# Patient Record
Sex: MALE | Race: WHITE | Employment: OTHER | URBAN - METROPOLITAN AREA
[De-identification: names, ages, dates, MRNs, and addresses within clinical notes are randomized per-mention and may not be internally consistent; named-entity substitution may affect disease eponyms.]

---

## 2017-11-03 ENCOUNTER — APPOINTMENT (OUTPATIENT)
Dept: GENERAL RADIOLOGY | Age: 37
End: 2017-11-03
Attending: NURSE PRACTITIONER
Payer: COMMERCIAL

## 2017-11-03 ENCOUNTER — HOSPITAL ENCOUNTER (OUTPATIENT)
Age: 37
Discharge: HOME | End: 2017-11-03
Payer: COMMERCIAL

## 2017-11-03 VITALS
TEMPERATURE: 98 F | WEIGHT: 165 LBS | SYSTOLIC BLOOD PRESSURE: 144 MMHG | OXYGEN SATURATION: 100 % | HEART RATE: 65 BPM | HEIGHT: 69 IN | BODY MASS INDEX: 24.44 KG/M2 | RESPIRATION RATE: 16 BRPM | DIASTOLIC BLOOD PRESSURE: 90 MMHG

## 2017-11-03 DIAGNOSIS — S62.354A CLOSED NONDISPLACED FRACTURE OF SHAFT OF FOURTH METACARPAL BONE OF RIGHT HAND, INITIAL ENCOUNTER: Primary | ICD-10-CM

## 2017-11-03 PROBLEM — L72.0 EPIDERMAL CYST OF FACE: Status: ACTIVE | Noted: 2017-11-03

## 2017-11-03 PROCEDURE — 99204 OFFICE O/P NEW MOD 45 MIN: CPT

## 2017-11-03 PROCEDURE — 29125 APPL SHORT ARM SPLINT STATIC: CPT

## 2017-11-03 PROCEDURE — 73130 X-RAY EXAM OF HAND: CPT | Performed by: NURSE PRACTITIONER

## 2017-11-03 PROCEDURE — 99203 OFFICE O/P NEW LOW 30 MIN: CPT

## 2017-11-03 RX ORDER — HYDROCODONE BITARTRATE AND ACETAMINOPHEN 5; 325 MG/1; MG/1
1 TABLET ORAL EVERY 6 HOURS PRN
Qty: 20 TABLET | Refills: 0 | Status: SHIPPED | OUTPATIENT
Start: 2017-11-03 | End: 2017-12-21 | Stop reason: ALTCHOICE

## 2017-11-03 RX ORDER — HYDROCODONE BITARTRATE AND ACETAMINOPHEN 5; 325 MG/1; MG/1
1 TABLET ORAL EVERY 6 HOURS PRN
Status: DISCONTINUED | OUTPATIENT
Start: 2017-11-03 | End: 2017-11-03

## 2017-11-03 NOTE — ED PROVIDER NOTES
Patient Seen in: [address] [facility]    History   Patient presents with:  Hand Injury    Stated Complaint: right hand injury    14-year-old male presents today with complaints of right hand pain and swelling.   Patient states was using a drill range of motion. Neck supple. Pulmonary/Chest: Effort normal.   Musculoskeletal:   Pain with palpation over the fourth and fifth metacarpal of the right hand. Mild swelling to the dorsal aspect of the region. No crepitus or gross deformity noted.   Skin pain.      Disposition and Plan     Clinical Impression:  Closed displaced fracture of shaft of fourth metacarpal bone of right hand, initial encounter  (primary encounter diagnosis)    Disposition:  Discharge    Follow-up:  Bharath Ryan, [address] [place] [address]

## 2017-11-03 NOTE — ED INITIAL ASSESSMENT (HPI)
Pt states was drilling with a hand drill last night and the drill twisted suddenly and pt felt sudden sharp pain in right hand. States suddenly became nauseated and vomitied x 1. Took a tylenol with codeine and slept all night.  This am moved his hand and f

## 2017-11-06 ENCOUNTER — OFFICE VISIT (OUTPATIENT)
Dept: FAMILY MEDICINE CLINIC | Facility: CLINIC | Age: 37
End: 2017-11-06

## 2017-11-06 VITALS
WEIGHT: 165 LBS | DIASTOLIC BLOOD PRESSURE: 64 MMHG | HEIGHT: 68.5 IN | BODY MASS INDEX: 24.72 KG/M2 | HEART RATE: 76 BPM | TEMPERATURE: 98 F | SYSTOLIC BLOOD PRESSURE: 110 MMHG | RESPIRATION RATE: 16 BRPM

## 2017-11-06 DIAGNOSIS — S62.91XA RIGHT HAND FRACTURE, CLOSED, INITIAL ENCOUNTER: Primary | ICD-10-CM

## 2017-11-06 PROCEDURE — 99203 OFFICE O/P NEW LOW 30 MIN: CPT | Performed by: FAMILY MEDICINE

## 2017-11-06 NOTE — PROGRESS NOTES
Patricia Reaves is a 40year old male. Patient presents with:  [facility] F/U: right hand fracture- thursday       HPI:   Hand fracture. Injury on Thursday, was drilling and the electric drill caught and twisted his hand and he heard a pop.   Seen in auscultation  CARDIO: RRR without murmur  GI: good BS's,no masses, HSM or tenderness  EXTREMITIES: no cyanosis, clubbing or edema, right arm in a splint, fingers warm, pink and moving without pain. No swelling or redness in forearm.      ASSESSMENT AND PLAN

## 2017-12-21 ENCOUNTER — OFFICE VISIT (OUTPATIENT)
Dept: FAMILY MEDICINE CLINIC | Facility: CLINIC | Age: 37
End: 2017-12-21

## 2017-12-21 VITALS
WEIGHT: 167 LBS | SYSTOLIC BLOOD PRESSURE: 128 MMHG | HEART RATE: 67 BPM | OXYGEN SATURATION: 98 % | HEIGHT: 68 IN | TEMPERATURE: 99 F | BODY MASS INDEX: 25.31 KG/M2 | DIASTOLIC BLOOD PRESSURE: 82 MMHG | RESPIRATION RATE: 14 BRPM

## 2017-12-21 DIAGNOSIS — Z00.00 HEALTHY ADULT ON ROUTINE PHYSICAL EXAMINATION: Primary | ICD-10-CM

## 2017-12-21 PROCEDURE — 99395 PREV VISIT EST AGE 18-39: CPT | Performed by: FAMILY MEDICINE

## 2017-12-21 PROCEDURE — G0438 PPPS, INITIAL VISIT: HCPCS | Performed by: FAMILY MEDICINE

## 2017-12-21 NOTE — PROGRESS NOTES
Kori Corrigan is a 40year old male who presents for a complete physical exam.   HPI:   Pt complains of nothing today. Hand fracture has healed. Feeling much better. Was following with Dr. Windy Dias.    Has a small lump on another finger, not painful or grow of anemia  ENDOCRINE: denies thyroid history   ALL/ASTHMA: denies hx of allergy or asthma     EXAM:   /82 (BP Location: Right arm, Patient Position: Sitting, Cuff Size: adult)   Pulse 67   Temp 99.1 °F (37.3 °C) (Temporal)   Resp 14   Ht 68\"   Wt 16

## 2018-01-22 ENCOUNTER — PATIENT OUTREACH (OUTPATIENT)
Dept: FAMILY MEDICINE CLINIC | Facility: CLINIC | Age: 38
End: 2018-01-22

## 2018-01-31 ENCOUNTER — TELEPHONE (OUTPATIENT)
Dept: FAMILY MEDICINE CLINIC | Facility: CLINIC | Age: 38
End: 2018-01-31

## 2018-02-06 ENCOUNTER — NURSE ONLY (OUTPATIENT)
Dept: FAMILY MEDICINE CLINIC | Facility: CLINIC | Age: 38
End: 2018-02-06

## 2018-02-06 PROCEDURE — 90471 IMMUNIZATION ADMIN: CPT | Performed by: FAMILY MEDICINE

## 2018-02-06 PROCEDURE — 86580 TB INTRADERMAL TEST: CPT | Performed by: FAMILY MEDICINE

## 2018-02-06 PROCEDURE — 90715 TDAP VACCINE 7 YRS/> IM: CPT | Performed by: FAMILY MEDICINE

## 2018-02-06 NOTE — PROGRESS NOTES
Patient to clinic for 1 step TB and Tdap. PPD placed right forearm, bleb noted. Patient advised to schedule nurse visit in 48-72 hours to have PPD read    Tdap given IM right deltoid.   VIS given

## 2018-02-08 ENCOUNTER — NURSE ONLY (OUTPATIENT)
Dept: FAMILY MEDICINE CLINIC | Facility: CLINIC | Age: 38
End: 2018-02-08

## 2018-02-08 DIAGNOSIS — Z11.1 ENCOUNTER FOR PPD TEST: Primary | ICD-10-CM

## 2018-02-08 LAB — INDURATION (): 0 MM (ref 0–11)

## 2018-02-08 NOTE — PROGRESS NOTES
PPD Placed 2/6/18 right forearm. Read today, 2/8/18, results negative.     Form completed and given to patient  Copy sent to scanning

## 2019-02-01 ENCOUNTER — OFFICE VISIT (OUTPATIENT)
Dept: FAMILY MEDICINE CLINIC | Facility: CLINIC | Age: 39
End: 2019-02-01
Payer: COMMERCIAL

## 2019-02-01 VITALS
HEART RATE: 80 BPM | DIASTOLIC BLOOD PRESSURE: 80 MMHG | SYSTOLIC BLOOD PRESSURE: 122 MMHG | TEMPERATURE: 99 F | HEIGHT: 68 IN | BODY MASS INDEX: 23.79 KG/M2 | WEIGHT: 157 LBS | RESPIRATION RATE: 16 BRPM

## 2019-02-01 DIAGNOSIS — N50.811 TESTICULAR PAIN, RIGHT: Primary | ICD-10-CM

## 2019-02-01 DIAGNOSIS — N50.89 TESTICULAR LUMP: ICD-10-CM

## 2019-02-01 LAB
APPEARANCE: CLEAR
BILIRUBIN: NEGATIVE
GLUCOSE (URINE DIPSTICK): NEGATIVE MG/DL
KETONES (URINE DIPSTICK): NEGATIVE MG/DL
LEUKOCYTES: NEGATIVE
MULTISTIX LOT#: NORMAL NUMERIC
NITRITE, URINE: NEGATIVE
OCCULT BLOOD: NEGATIVE
PH, URINE: 7 (ref 4.5–8)
PROTEIN (URINE DIPSTICK): NEGATIVE MG/DL
SPECIFIC GRAVITY: 1.02 (ref 1–1.03)
URINE-COLOR: YELLOW
UROBILINOGEN,SEMI-QN: 0.2 MG/DL (ref 0–1.9)

## 2019-02-01 PROCEDURE — 99214 OFFICE O/P EST MOD 30 MIN: CPT | Performed by: FAMILY MEDICINE

## 2019-02-01 PROCEDURE — 87491 CHLMYD TRACH DNA AMP PROBE: CPT | Performed by: FAMILY MEDICINE

## 2019-02-01 PROCEDURE — 87591 N.GONORRHOEAE DNA AMP PROB: CPT | Performed by: FAMILY MEDICINE

## 2019-02-01 PROCEDURE — 81003 URINALYSIS AUTO W/O SCOPE: CPT | Performed by: FAMILY MEDICINE

## 2019-02-01 NOTE — PROGRESS NOTES
Emiliana Gutierrez is a 45year old male. Patient presents with:  Pain: groin pain and swelling      HPI:   Right testicle is a lot more sensitive if it gets hit or with sitting or moving. That started at least a month ago. It's been mild.    This week woke u kg/m².      GENERAL: well developed, well nourished,in no apparent distress  SKIN: no rashes,no suspicious lesions, no rashes.    HEENT: atraumatic, normocephalic,ears and throat are clear  NECK: supple,no adenopathy   LUNGS: clear to auscultation  CARDIO:

## 2019-02-03 LAB
C TRACH DNA SPEC QL NAA+PROBE: NEGATIVE
N GONORRHOEA DNA SPEC QL NAA+PROBE: NEGATIVE

## 2019-02-05 ENCOUNTER — HOSPITAL ENCOUNTER (OUTPATIENT)
Dept: ULTRASOUND IMAGING | Age: 39
Discharge: HOME | End: 2019-02-05
Attending: FAMILY MEDICINE
Payer: COMMERCIAL

## 2019-02-05 ENCOUNTER — TELEPHONE (OUTPATIENT)
Dept: FAMILY MEDICINE CLINIC | Facility: CLINIC | Age: 39
End: 2019-02-05

## 2019-02-05 DIAGNOSIS — N50.89 TESTICULAR MASS: Primary | ICD-10-CM

## 2019-02-05 DIAGNOSIS — N50.811 TESTICULAR PAIN, RIGHT: ICD-10-CM

## 2019-02-05 PROCEDURE — 76870 US EXAM SCROTUM: CPT | Performed by: FAMILY MEDICINE

## 2019-02-05 PROCEDURE — 93975 VASCULAR STUDY: CPT | Performed by: FAMILY MEDICINE

## 2019-02-05 NOTE — TELEPHONE ENCOUNTER
Called pt with results of testicular US showing a mass of the right testicle. Testicular cancer is likely. Advised consult with urology. Referral placed. RN called Dr. Rachel Mendosa office and they can get him in tomorrow at 5 am in [employer].  Appointment peter

## 2019-02-15 ENCOUNTER — ANESTHESIA (OUTPATIENT)
Dept: SURGERY | Facility: HOSPITAL | Age: 39
End: 2019-02-15
Payer: COMMERCIAL

## 2019-02-15 ENCOUNTER — ANESTHESIA EVENT (OUTPATIENT)
Dept: SURGERY | Facility: HOSPITAL | Age: 39
End: 2019-02-15
Payer: COMMERCIAL

## 2019-02-15 ENCOUNTER — HOSPITAL ENCOUNTER (OUTPATIENT)
Facility: HOSPITAL | Age: 39
Setting detail: HOSPITAL OUTPATIENT SURGERY
Discharge: HOME | End: 2019-02-15
Attending: UROLOGY | Admitting: UROLOGY
Payer: COMMERCIAL

## 2019-02-15 VITALS
SYSTOLIC BLOOD PRESSURE: 118 MMHG | HEART RATE: 51 BPM | RESPIRATION RATE: 18 BRPM | HEIGHT: 68 IN | WEIGHT: 159.63 LBS | OXYGEN SATURATION: 100 % | DIASTOLIC BLOOD PRESSURE: 75 MMHG | TEMPERATURE: 98 F | BODY MASS INDEX: 24.19 KG/M2

## 2019-02-15 DIAGNOSIS — N50.89 MASS OF RIGHT TESTIS: ICD-10-CM

## 2019-02-15 PROCEDURE — 0VT90ZZ RESECTION OF RIGHT TESTIS, OPEN APPROACH: ICD-10-PCS | Performed by: UROLOGY

## 2019-02-15 PROCEDURE — 88309 TISSUE EXAM BY PATHOLOGIST: CPT | Performed by: UROLOGY

## 2019-02-15 PROCEDURE — 0VTJ0ZZ RESECTION OF RIGHT EPIDIDYMIS, OPEN APPROACH: ICD-10-PCS | Performed by: UROLOGY

## 2019-02-15 PROCEDURE — 0VBF0ZZ EXCISION OF RIGHT SPERMATIC CORD, OPEN APPROACH: ICD-10-PCS | Performed by: UROLOGY

## 2019-02-15 RX ORDER — HYDROCODONE BITARTRATE AND ACETAMINOPHEN 5; 325 MG/1; MG/1
2 TABLET ORAL AS NEEDED
Status: DISCONTINUED | OUTPATIENT
Start: 2019-02-15 | End: 2019-02-15

## 2019-02-15 RX ORDER — NALOXONE HYDROCHLORIDE 0.4 MG/ML
80 INJECTION, SOLUTION INTRAMUSCULAR; INTRAVENOUS; SUBCUTANEOUS AS NEEDED
Status: DISCONTINUED | OUTPATIENT
Start: 2019-02-15 | End: 2019-02-15

## 2019-02-15 RX ORDER — HYDROCODONE BITARTRATE AND ACETAMINOPHEN 5; 325 MG/1; MG/1
1 TABLET ORAL AS NEEDED
Status: DISCONTINUED | OUTPATIENT
Start: 2019-02-15 | End: 2019-02-15

## 2019-02-15 RX ORDER — CLINDAMYCIN PHOSPHATE 900 MG/50ML
900 INJECTION INTRAVENOUS ONCE
Status: DISCONTINUED | OUTPATIENT
Start: 2019-02-15 | End: 2019-02-15 | Stop reason: HOSPADM

## 2019-02-15 RX ORDER — SODIUM CHLORIDE, SODIUM LACTATE, POTASSIUM CHLORIDE, CALCIUM CHLORIDE 600; 310; 30; 20 MG/100ML; MG/100ML; MG/100ML; MG/100ML
INJECTION, SOLUTION INTRAVENOUS CONTINUOUS
Status: DISCONTINUED | OUTPATIENT
Start: 2019-02-15 | End: 2019-02-15

## 2019-02-15 RX ORDER — ROPIVACAINE HYDROCHLORIDE 5 MG/ML
INJECTION, SOLUTION EPIDURAL; INFILTRATION; PERINEURAL AS NEEDED
Status: DISCONTINUED | OUTPATIENT
Start: 2019-02-15 | End: 2019-02-15 | Stop reason: HOSPADM

## 2019-02-15 RX ORDER — CLINDAMYCIN PHOSPHATE 600 MG/50ML
INJECTION INTRAVENOUS
Status: COMPLETED | OUTPATIENT
Start: 2019-02-15 | End: 2019-02-15

## 2019-02-15 RX ORDER — ACETAMINOPHEN 500 MG
1000 TABLET ORAL ONCE
Status: DISCONTINUED | OUTPATIENT
Start: 2019-02-15 | End: 2019-02-15 | Stop reason: HOSPADM

## 2019-02-15 RX ORDER — HYDROCODONE BITARTRATE AND ACETAMINOPHEN 5; 325 MG/1; MG/1
1 TABLET ORAL EVERY 6 HOURS PRN
Qty: 20 TABLET | Refills: 1 | Status: SHIPPED | OUTPATIENT
Start: 2019-02-15 | End: 2019-02-25

## 2019-02-15 RX ORDER — HYDROMORPHONE HYDROCHLORIDE 1 MG/ML
0.4 INJECTION, SOLUTION INTRAMUSCULAR; INTRAVENOUS; SUBCUTANEOUS EVERY 5 MIN PRN
Status: DISCONTINUED | OUTPATIENT
Start: 2019-02-15 | End: 2019-02-15

## 2019-02-15 RX ORDER — ACETAMINOPHEN 500 MG
1000 TABLET ORAL ONCE AS NEEDED
Status: DISCONTINUED | OUTPATIENT
Start: 2019-02-15 | End: 2019-02-15

## 2019-02-15 NOTE — ANESTHESIA POSTPROCEDURE EVALUATION
Adina Gilman [address]  Odalys Dickinson Patient Status:  Hospital Outpatient Surgery   Age/Gender 45year old male MRN [id number]   Location [address] [facility] Attending Wendie Tao MD   Hosp Day # 0 PCP DO Mary Graves

## 2019-02-15 NOTE — H&P
Pre-op Diagnosis: Mass of right testis [N50.9]    The above referenced H&P from 2/6/19 was reviewed by Dayanara Rdz MD on 2/15/2019, the patient was examined and no significant changes have occurred in the patient's condition since the H&P was perform

## 2019-02-15 NOTE — ANESTHESIA PREPROCEDURE EVALUATION
PRE-OP EVALUATION    Patient Name: Eva Elise    Pre-op Diagnosis: Mass of right testis [N50.9]    Procedure(s):  RIGHT RADICAL ORCHIECTOMY    Surgeon(s) and Role:     * La Nelson MD - Primary    Pre-op vitals reviewed.   Temp: 98.2 °F (36.8 Pulmonary    Pulmonary exam normal.                 Other findings            ASA: 2   Plan: general  NPO status verified and patient meets guidelines. Patient has not taken beta blockers in last 24 hours.   Post-procedure pain management plan discussed wi

## 2019-02-27 PROBLEM — C62.92: Status: ACTIVE | Noted: 2019-02-27

## 2019-04-17 PROCEDURE — 84702 CHORIONIC GONADOTROPIN TEST: CPT | Performed by: INTERNAL MEDICINE

## 2019-06-21 PROCEDURE — 84702 CHORIONIC GONADOTROPIN TEST: CPT | Performed by: INTERNAL MEDICINE

## 2019-09-23 PROCEDURE — 84702 CHORIONIC GONADOTROPIN TEST: CPT | Performed by: INTERNAL MEDICINE

## 2019-12-16 ENCOUNTER — OFFICE VISIT (OUTPATIENT)
Dept: FAMILY MEDICINE CLINIC | Facility: CLINIC | Age: 39
End: 2019-12-16
Payer: COMMERCIAL

## 2019-12-16 ENCOUNTER — HOSPITAL ENCOUNTER (OUTPATIENT)
Dept: GENERAL RADIOLOGY | Age: 39
Discharge: HOME | End: 2019-12-16
Attending: FAMILY MEDICINE
Payer: COMMERCIAL

## 2019-12-16 ENCOUNTER — TELEPHONE (OUTPATIENT)
Dept: FAMILY MEDICINE CLINIC | Facility: CLINIC | Age: 39
End: 2019-12-16

## 2019-12-16 VITALS
BODY MASS INDEX: 23.95 KG/M2 | WEIGHT: 158 LBS | TEMPERATURE: 99 F | SYSTOLIC BLOOD PRESSURE: 128 MMHG | HEART RATE: 72 BPM | DIASTOLIC BLOOD PRESSURE: 86 MMHG | RESPIRATION RATE: 16 BRPM | HEIGHT: 68 IN

## 2019-12-16 DIAGNOSIS — R07.9 CHEST PAIN, UNSPECIFIED TYPE: ICD-10-CM

## 2019-12-16 DIAGNOSIS — R07.9 CHEST PAIN, UNSPECIFIED TYPE: Primary | ICD-10-CM

## 2019-12-16 DIAGNOSIS — F41.9 ANXIETY: ICD-10-CM

## 2019-12-16 DIAGNOSIS — R12 HEART BURN: ICD-10-CM

## 2019-12-16 PROCEDURE — 71046 X-RAY EXAM CHEST 2 VIEWS: CPT | Performed by: FAMILY MEDICINE

## 2019-12-16 PROCEDURE — 93000 ELECTROCARDIOGRAM COMPLETE: CPT | Performed by: FAMILY MEDICINE

## 2019-12-16 PROCEDURE — 99214 OFFICE O/P EST MOD 30 MIN: CPT | Performed by: FAMILY MEDICINE

## 2019-12-16 NOTE — PROGRESS NOTES
Alejandra Carrero is a 44year old male. Patient presents with:  Chest Pain: possible pulled muscle  Anxiety: palpitations      HPI:   Last week strted with a pulled muscle, hurt when he moved. Then he started getting heartburn.  He was worried about everyth well developed, well nourished,in no apparent distress  SKIN: no rashes,no suspicious lesions  HEENT: atraumatic, normocephalic,ears and throat are clear  NECK: supple,no adenopathy,   LUNGS: clear to auscultation  CARDIO: RRR without murmur  GI: good BS's

## 2019-12-16 NOTE — TELEPHONE ENCOUNTER
Pt states last week he noticed some intermittent muscle pain in his chest.  Pt is concerned this is anxiety. Pt denies numbness, tingling in arms- no pain in arms or jaw    Pt states he has no blood pressure issues.     Pt denies SOB and difficulty tyson

## 2020-01-14 ENCOUNTER — TELEPHONE (OUTPATIENT)
Dept: FAMILY MEDICINE CLINIC | Facility: CLINIC | Age: 40
End: 2020-01-14

## 2020-01-14 DIAGNOSIS — N50.89 TESTICULAR MASS: Primary | ICD-10-CM

## 2020-01-14 DIAGNOSIS — C62.11 MALIGNANT NEOPLASM OF DESCENDED RIGHT TESTIS (HCC): ICD-10-CM

## 2020-01-14 NOTE — TELEPHONE ENCOUNTER
Spouse called back-updated insurance information, okay to work on referral now.   Please call spouse at [phone]

## 2020-01-14 NOTE — TELEPHONE ENCOUNTER
Labs can be drawn here. Will need updated insurance info before referral can be placed.     Left message on voicemail/answering machine for patient wife to call office

## 2020-01-14 NOTE — TELEPHONE ENCOUNTER
Spouse Yaneth called. Wants to know if the labs that Dr Wing Perry (Oncologist) ordered are able to be done here in the office. If so she would need to be schedule.  She also wants to know if with her new HMO insurance will Dr Tip Layne need to do a new ref

## 2020-01-15 NOTE — TELEPHONE ENCOUNTER
Called the wife and advised that the referral is authorized for 3 visits and the labs that were ordered can be done   Scheduled the nurse visit   Future Appointments   Date Time Provider Toshia Cantrell   1/23/2020  4:00 PM [facility] NURSE MELISSA EPPS

## 2020-01-23 ENCOUNTER — NURSE ONLY (OUTPATIENT)
Dept: FAMILY MEDICINE CLINIC | Facility: CLINIC | Age: 40
End: 2020-01-23

## 2020-01-23 DIAGNOSIS — C62.91 SEMINOMA OF RIGHT TESTIS (HCC): ICD-10-CM

## 2020-01-23 LAB
AFP-TM SERPL-MCNC: 6.3 NG/ML (ref ?–8)
LDH SERPL L TO P-CCNC: 149 U/L (ref 87–241)

## 2020-01-23 PROCEDURE — 82105 ALPHA-FETOPROTEIN SERUM: CPT | Performed by: INTERNAL MEDICINE

## 2020-01-23 PROCEDURE — 84702 CHORIONIC GONADOTROPIN TEST: CPT | Performed by: INTERNAL MEDICINE

## 2020-01-23 PROCEDURE — 83615 LACTATE (LD) (LDH) ENZYME: CPT | Performed by: INTERNAL MEDICINE

## 2020-01-23 NOTE — PROGRESS NOTES
Patient to clinic for labs ordered in epic per Dr Allie Kumar.  2 gold and 1 mint tube drawn right AC x 1 attempt

## 2020-01-25 LAB — BETA HCG QUANT (TUMOR MARKER): <1 IU/L

## 2020-05-21 ENCOUNTER — TELEPHONE (OUTPATIENT)
Dept: FAMILY MEDICINE CLINIC | Facility: CLINIC | Age: 40
End: 2020-05-21

## 2020-05-21 NOTE — TELEPHONE ENCOUNTER
SPOUSE CALLED TO ADV [facility] AND NEED[facility] REFERRAL AND NEEDS TO KNOW WHERE TO GO. ALSO PLACED LABS, IS THIS SOMETHING THAT WE CAN DO HERE?     PLEASE CALL AND ADV    THANK YOU

## 2020-05-21 NOTE — TELEPHONE ENCOUNTER
Wife notified if Dr Swenson Led is in [address] can be done at [facility]. No referral needed. Number to central scheduling provided.     Lab appt scheduled  Future Appointments   Date Time Provider Toshia Cantrell   5/22/2020 10:00 AM REF [facility] REF EM

## 2020-05-22 ENCOUNTER — LABORATORY ENCOUNTER (OUTPATIENT)
Dept: LAB | Age: 40
End: 2020-05-22
Attending: FAMILY MEDICINE
Payer: COMMERCIAL

## 2020-05-22 DIAGNOSIS — C62.90 SEMINOMA (HCC): ICD-10-CM

## 2020-05-22 DIAGNOSIS — C62.91 SEMINOMA OF RIGHT TESTIS (HCC): ICD-10-CM

## 2020-05-22 PROCEDURE — 82105 ALPHA-FETOPROTEIN SERUM: CPT

## 2020-05-22 PROCEDURE — 84704 HCG FREE BETACHAIN TEST: CPT

## 2020-05-22 PROCEDURE — 83615 LACTATE (LD) (LDH) ENZYME: CPT

## 2020-05-22 PROCEDURE — 36415 COLL VENOUS BLD VENIPUNCTURE: CPT

## 2020-05-26 ENCOUNTER — HOSPITAL ENCOUNTER (OUTPATIENT)
Dept: CT IMAGING | Facility: HOSPITAL | Age: 40
Discharge: HOME | End: 2020-05-26
Attending: INTERNAL MEDICINE
Payer: COMMERCIAL

## 2020-05-26 DIAGNOSIS — C62.91 SEMINOMA OF RIGHT TESTIS (HCC): ICD-10-CM

## 2020-05-26 PROCEDURE — 74177 CT ABD & PELVIS W/CONTRAST: CPT | Performed by: INTERNAL MEDICINE

## 2020-05-26 PROCEDURE — 82565 ASSAY OF CREATININE: CPT

## 2020-11-06 ENCOUNTER — OFFICE VISIT (OUTPATIENT)
Dept: FAMILY MEDICINE CLINIC | Facility: CLINIC | Age: 40
End: 2020-11-06

## 2020-11-06 VITALS
WEIGHT: 181.63 LBS | DIASTOLIC BLOOD PRESSURE: 84 MMHG | BODY MASS INDEX: 27.21 KG/M2 | TEMPERATURE: 99 F | RESPIRATION RATE: 17 BRPM | OXYGEN SATURATION: 98 % | HEIGHT: 68.5 IN | HEART RATE: 74 BPM | SYSTOLIC BLOOD PRESSURE: 124 MMHG

## 2020-11-06 DIAGNOSIS — L02.811 ABSCESS, POSTAURICULAR: Primary | ICD-10-CM

## 2020-11-06 PROCEDURE — 3074F SYST BP LT 130 MM HG: CPT | Performed by: FAMILY MEDICINE

## 2020-11-06 PROCEDURE — 3008F BODY MASS INDEX DOCD: CPT | Performed by: FAMILY MEDICINE

## 2020-11-06 PROCEDURE — 3079F DIAST BP 80-89 MM HG: CPT | Performed by: FAMILY MEDICINE

## 2020-11-06 PROCEDURE — 99213 OFFICE O/P EST LOW 20 MIN: CPT | Performed by: FAMILY MEDICINE

## 2020-11-06 RX ORDER — SULFAMETHOXAZOLE AND TRIMETHOPRIM 800; 160 MG/1; MG/1
1 TABLET ORAL 2 TIMES DAILY
Qty: 20 TABLET | Refills: 0 | Status: SHIPPED | OUTPATIENT
Start: 2020-11-06 | End: 2020-11-16

## 2020-11-06 NOTE — PROGRESS NOTES
[facility] Office Note  Chief Complaint:   Patient presents with:  Cyst: per pt- right ear, red, swelled up, noticed beginning of week      HPI:   35 yo M here with complaints of a tender cyst / lump behind his R ear   No prior javier answering appropriately   SKIN: No pallor, no erythema, no cyanosis, warm and dry  Eyes: wnl, normal conjunctiva   HEAD: Normocephalic, atraumatic  EENT: OP - wnl, moist, Nares normal, Swelling - erythematous and tender noted post auricular region - measur

## 2020-12-03 ENCOUNTER — TELEPHONE (OUTPATIENT)
Dept: FAMILY MEDICINE CLINIC | Facility: CLINIC | Age: 40
End: 2020-12-03

## 2020-12-03 NOTE — TELEPHONE ENCOUNTER
Left message on wife's voicemail stating RN does not think the labs are fasting but should contact ordering provider to confirm.

## 2020-12-03 NOTE — TELEPHONE ENCOUNTER
Patient is coming in on 12/14 for lab work ordered by oncology. Wife said he has had it done here before. Wondering if he will need to fast? Please call wife back, leave information on voicemail if she doesn't answer.

## 2020-12-14 ENCOUNTER — NURSE ONLY (OUTPATIENT)
Dept: FAMILY MEDICINE CLINIC | Facility: CLINIC | Age: 40
End: 2020-12-14

## 2020-12-14 DIAGNOSIS — C62.90 MALIGNANT NEOPLASM OF TESTICLE, UNSPECIFIED LATERALITY, UNSPECIFIED WHETHER DESCENDED OR UNDESCENDED (HCC): ICD-10-CM

## 2020-12-14 DIAGNOSIS — C62.91 MALIGNANT NEOPLASM OF RIGHT TESTIS, UNSPECIFIED WHETHER DESCENDED OR UNDESCENDED (HCC): Primary | ICD-10-CM

## 2020-12-14 DIAGNOSIS — C62.91 SEMINOMA OF RIGHT TESTIS (HCC): ICD-10-CM

## 2020-12-14 PROCEDURE — 84704 HCG FREE BETACHAIN TEST: CPT | Performed by: INTERNAL MEDICINE

## 2020-12-14 PROCEDURE — 82105 ALPHA-FETOPROTEIN SERUM: CPT | Performed by: INTERNAL MEDICINE

## 2020-12-14 PROCEDURE — 83615 LACTATE (LD) (LDH) ENZYME: CPT | Performed by: INTERNAL MEDICINE

## 2020-12-14 NOTE — PROGRESS NOTES
Patient to clinic for labs per Zackary  Unable to pull HCG beta subunit Qn. Spoke with staff in [facility] lab and was unable to figure out why.   HCG beta has not been drawn since 2019  Patient notified unable to draw lab and he should contact sp

## 2020-12-15 ENCOUNTER — HOSPITAL ENCOUNTER (OUTPATIENT)
Dept: CT IMAGING | Facility: HOSPITAL | Age: 40
Discharge: HOME | End: 2020-12-15
Attending: INTERNAL MEDICINE
Payer: COMMERCIAL

## 2020-12-15 DIAGNOSIS — C62.91 SEMINOMA OF RIGHT TESTIS (HCC): ICD-10-CM

## 2020-12-15 PROCEDURE — 82565 ASSAY OF CREATININE: CPT

## 2020-12-15 PROCEDURE — 74177 CT ABD & PELVIS W/CONTRAST: CPT | Performed by: INTERNAL MEDICINE

## 2021-11-16 ENCOUNTER — PATIENT MESSAGE (OUTPATIENT)
Dept: FAMILY MEDICINE CLINIC | Facility: CLINIC | Age: 41
End: 2021-11-16

## 2021-11-16 NOTE — TELEPHONE ENCOUNTER
From: Mejia Peguero  To: Stefano Singh DO  Sent: 11/16/2021 2:54 PM CST  Subject: Referral Request    Dr. Kacey Johansen (my oncologist) wants me to get blood work and a CT scan done. Can I please receive a referral for the CT scan?  I'm pretty sure I can get the

## 2021-12-03 ENCOUNTER — TELEPHONE (OUTPATIENT)
Dept: FAMILY MEDICINE CLINIC | Facility: CLINIC | Age: 41
End: 2021-12-03

## 2021-12-03 NOTE — TELEPHONE ENCOUNTER
Order has already been entered by Dr Ng Speaks office. Can call central scheduling to schedule appt for test.  Wife notified and verbalized understanding.   Number to central scheduling provided

## 2021-12-03 NOTE — TELEPHONE ENCOUNTER
Pt's spouse called she is needing a referral placed for a CT. She states that Dr. Leonora Qiu is the physician ordering it.  Pt is wanting to know if once the dr stevenson referral does nurse call with information on where she goes to get it done and if it is approv

## 2021-12-13 ENCOUNTER — HOSPITAL ENCOUNTER (OUTPATIENT)
Dept: CT IMAGING | Facility: HOSPITAL | Age: 41
Discharge: HOME | End: 2021-12-13
Attending: NURSE PRACTITIONER
Payer: COMMERCIAL

## 2021-12-13 ENCOUNTER — NURSE ONLY (OUTPATIENT)
Dept: FAMILY MEDICINE CLINIC | Facility: CLINIC | Age: 41
End: 2021-12-13

## 2021-12-13 DIAGNOSIS — C62.91 SEMINOMA OF RIGHT TESTIS (HCC): ICD-10-CM

## 2021-12-13 PROCEDURE — 74177 CT ABD & PELVIS W/CONTRAST: CPT | Performed by: NURSE PRACTITIONER

## 2021-12-13 PROCEDURE — 82565 ASSAY OF CREATININE: CPT

## 2021-12-13 PROCEDURE — 83615 LACTATE (LD) (LDH) ENZYME: CPT | Performed by: NURSE PRACTITIONER

## 2021-12-13 PROCEDURE — 82105 ALPHA-FETOPROTEIN SERUM: CPT | Performed by: NURSE PRACTITIONER

## 2021-12-13 PROCEDURE — 84704 HCG FREE BETACHAIN TEST: CPT | Performed by: NURSE PRACTITIONER

## 2021-12-13 NOTE — PROGRESS NOTES
Patient to clinic for labs per oncology  2 gold and one mint tube drawn right medial AC x 1 attempt.     Patient left office in stable condition

## 2023-02-21 ENCOUNTER — OFFICE VISIT (OUTPATIENT)
Dept: FAMILY MEDICINE CLINIC | Facility: CLINIC | Age: 43
End: 2023-02-21
Payer: COMMERCIAL

## 2023-02-21 VITALS
HEART RATE: 86 BPM | SYSTOLIC BLOOD PRESSURE: 110 MMHG | WEIGHT: 164 LBS | TEMPERATURE: 99 F | BODY MASS INDEX: 24.29 KG/M2 | RESPIRATION RATE: 16 BRPM | HEIGHT: 69 IN | DIASTOLIC BLOOD PRESSURE: 74 MMHG | OXYGEN SATURATION: 97 %

## 2023-02-21 DIAGNOSIS — S46.912A STRAIN OF LEFT SHOULDER, INITIAL ENCOUNTER: Primary | ICD-10-CM

## 2023-02-21 PROCEDURE — 3008F BODY MASS INDEX DOCD: CPT | Performed by: FAMILY MEDICINE

## 2023-02-21 PROCEDURE — 3074F SYST BP LT 130 MM HG: CPT | Performed by: FAMILY MEDICINE

## 2023-02-21 PROCEDURE — 99214 OFFICE O/P EST MOD 30 MIN: CPT | Performed by: FAMILY MEDICINE

## 2023-02-21 PROCEDURE — 3078F DIAST BP <80 MM HG: CPT | Performed by: FAMILY MEDICINE

## 2023-04-17 ENCOUNTER — HOSPITAL ENCOUNTER (OUTPATIENT)
Dept: GENERAL RADIOLOGY | Age: 43
Discharge: HOME | End: 2023-04-17
Attending: FAMILY MEDICINE
Payer: COMMERCIAL

## 2023-04-17 ENCOUNTER — OFFICE VISIT (OUTPATIENT)
Dept: FAMILY MEDICINE CLINIC | Facility: CLINIC | Age: 43
End: 2023-04-17
Payer: COMMERCIAL

## 2023-04-17 VITALS
HEART RATE: 76 BPM | TEMPERATURE: 97 F | WEIGHT: 164.13 LBS | BODY MASS INDEX: 24 KG/M2 | DIASTOLIC BLOOD PRESSURE: 80 MMHG | RESPIRATION RATE: 18 BRPM | SYSTOLIC BLOOD PRESSURE: 132 MMHG | OXYGEN SATURATION: 99 %

## 2023-04-17 DIAGNOSIS — M25.612 DECREASED ROM OF LEFT SHOULDER: ICD-10-CM

## 2023-04-17 DIAGNOSIS — G89.29 CHRONIC LEFT SHOULDER PAIN: ICD-10-CM

## 2023-04-17 DIAGNOSIS — G89.29 CHRONIC LEFT SHOULDER PAIN: Primary | ICD-10-CM

## 2023-04-17 DIAGNOSIS — M25.512 CHRONIC LEFT SHOULDER PAIN: ICD-10-CM

## 2023-04-17 DIAGNOSIS — M25.512 CHRONIC LEFT SHOULDER PAIN: Primary | ICD-10-CM

## 2023-04-17 PROCEDURE — 73030 X-RAY EXAM OF SHOULDER: CPT | Performed by: FAMILY MEDICINE

## 2023-05-11 ENCOUNTER — OFFICE VISIT (OUTPATIENT)
Dept: FAMILY MEDICINE CLINIC | Facility: CLINIC | Age: 43
End: 2023-05-11
Payer: COMMERCIAL

## 2023-05-11 VITALS
SYSTOLIC BLOOD PRESSURE: 130 MMHG | RESPIRATION RATE: 18 BRPM | OXYGEN SATURATION: 97 % | HEART RATE: 71 BPM | BODY MASS INDEX: 24 KG/M2 | WEIGHT: 163.13 LBS | DIASTOLIC BLOOD PRESSURE: 70 MMHG | TEMPERATURE: 99 F

## 2023-05-11 DIAGNOSIS — B00.59 HERPES SIMPLEX DERMATITIS OF EYELID: Primary | ICD-10-CM

## 2023-05-11 PROCEDURE — 99214 OFFICE O/P EST MOD 30 MIN: CPT | Performed by: FAMILY MEDICINE

## 2023-05-11 PROCEDURE — 3078F DIAST BP <80 MM HG: CPT | Performed by: FAMILY MEDICINE

## 2023-05-11 PROCEDURE — 3075F SYST BP GE 130 - 139MM HG: CPT | Performed by: FAMILY MEDICINE

## 2023-05-11 RX ORDER — VALACYCLOVIR HYDROCHLORIDE 1 G/1
1000 TABLET, FILM COATED ORAL EVERY 12 HOURS SCHEDULED
Qty: 14 TABLET | Refills: 0 | Status: SHIPPED | OUTPATIENT
Start: 2023-05-11 | End: 2023-05-18

## 2024-05-31 ENCOUNTER — OFFICE VISIT (OUTPATIENT)
Dept: FAMILY MEDICINE CLINIC | Facility: CLINIC | Age: 44
End: 2024-05-31

## 2024-05-31 VITALS
HEIGHT: 69 IN | TEMPERATURE: 98 F | RESPIRATION RATE: 18 BRPM | DIASTOLIC BLOOD PRESSURE: 84 MMHG | BODY MASS INDEX: 25.92 KG/M2 | WEIGHT: 175 LBS | SYSTOLIC BLOOD PRESSURE: 134 MMHG | OXYGEN SATURATION: 98 % | HEART RATE: 68 BPM

## 2024-05-31 DIAGNOSIS — R59.1 LYMPHADENOPATHY: ICD-10-CM

## 2024-05-31 DIAGNOSIS — J02.9 SORE THROAT: ICD-10-CM

## 2024-05-31 DIAGNOSIS — R07.0 THROAT PAIN: Primary | ICD-10-CM

## 2024-05-31 PROBLEM — C62.91 SEMINOMA OF RIGHT TESTIS (HCC): Status: ACTIVE | Noted: 2024-05-31

## 2024-05-31 LAB
CONTROL LINE PRESENT WITH A CLEAR BACKGROUND (YES/NO): YES YES/NO
KIT EXPIRATION DATE: NORMAL DATE
KIT LOT #: NORMAL NUMERIC
STREP GRP A CUL-SCR: NEGATIVE

## 2024-05-31 PROCEDURE — 3079F DIAST BP 80-89 MM HG: CPT | Performed by: FAMILY MEDICINE

## 2024-05-31 PROCEDURE — 99214 OFFICE O/P EST MOD 30 MIN: CPT | Performed by: FAMILY MEDICINE

## 2024-05-31 PROCEDURE — 87880 STREP A ASSAY W/OPTIC: CPT | Performed by: FAMILY MEDICINE

## 2024-05-31 PROCEDURE — 3008F BODY MASS INDEX DOCD: CPT | Performed by: FAMILY MEDICINE

## 2024-05-31 PROCEDURE — 3075F SYST BP GE 130 - 139MM HG: CPT | Performed by: FAMILY MEDICINE

## 2024-05-31 RX ORDER — AMOXICILLIN AND CLAVULANATE POTASSIUM 875; 125 MG/1; MG/1
1 TABLET, FILM COATED ORAL 2 TIMES DAILY
Qty: 20 TABLET | Refills: 0 | Status: SHIPPED | OUTPATIENT
Start: 2024-05-31 | End: 2024-06-10

## 2024-05-31 NOTE — PROGRESS NOTES
Lg Elise is a 43 year old male.  Chief Complaint   Patient presents with    Sore Throat     C/o sore throat x 2 week        HPI:   Sore throat coming and going x 2-3 weeks. Worse the past couple of day. Today swollen on the left side, getting worse.   Hurts to swallow too. Feels like there is a knot when he swallows.     Took tylenol, because it does cause significant pain.   Drinking a lot of water and ice, which helps some.     No fevers. No sickness. No URI symptoms, nasal congestion, no coughing.   Breathing is normal.       ALLERGIES:  Allergies   Allergen Reactions    Vitamin D ANAPHYLAXIS    Keflex [Cephalexin] UNKNOWN         Current Outpatient Medications   Medication Sig Dispense Refill    amoxicillin clavulanate 875-125 MG Oral Tab Take 1 tablet by mouth 2 (two) times daily for 10 days. 20 tablet 0      Past Medical History:    Mass of right testis      Social History:  Social History     Socioeconomic History    Marital status:    Tobacco Use    Smoking status: Never    Smokeless tobacco: Never   Substance and Sexual Activity    Alcohol use: Yes     Comment: rarely    Drug use: Yes     Types: Cannabis     Comment: weekly        BP Readings from Last 6 Encounters:   05/31/24 134/84   05/11/23 130/70   04/17/23 132/80   02/21/23 110/74   12/20/21 131/83   12/28/20 110/80       Wt Readings from Last 6 Encounters:   05/31/24 175 lb (79.4 kg)   05/11/23 163 lb 2 oz (74 kg)   04/17/23 164 lb 2 oz (74.4 kg)   02/21/23 164 lb (74.4 kg)   12/20/21 173 lb 3.2 oz (78.6 kg)   12/28/20 182 lb 9.6 oz (82.8 kg)       REVIEW OF SYSTEMS:   GENERAL HEALTH: feels well no complaints  SKIN: denies any unusual skin lesions or rashes  RESPIRATORY: denies shortness of breath with exertion  CARDIOVASCULAR: denies chest pain on exertion  GI: denies abdominal pain and denies heartburn  NEURO: denies headaches    EXAM:   /84   Pulse 68   Temp 98.1 °F (36.7 °C)   Resp 18   Ht 5' 9\" (1.753 m)   Wt 175 lb  (79.4 kg)   SpO2 98%   BMI 25.84 kg/m²  Body mass index is 25.84 kg/m².      GENERAL: well developed, well nourished,in no apparent distress  SKIN: no rashes,no suspicious lesions  HEENT: atraumatic, normocephalic,ears and throat is erythematous with 1 + tonsils. No exudates.   NECK: supple, symmetric submandibular LAD, with tenderness on the left side in area of concern. No mass palpated, but a small lymph node likely.   LUNGS: clear to auscultation  CARDIO: RRR without murmur  GI: good BS's,no masses, HSM or tenderness  EXTREMITIES: no cyanosis, clubbing or edema    ASSESSMENT AND PLAN:     Encounter Diagnoses   Name Primary?    Throat pain Yes    Sore throat     Lymphadenopathy        Diagnoses and all orders for this visit:    Throat pain  -     Rapid Strep  -     amoxicillin clavulanate 875-125 MG Oral Tab; Take 1 tablet by mouth 2 (two) times daily for 10 days.    Sore throat  -     amoxicillin clavulanate 875-125 MG Oral Tab; Take 1 tablet by mouth 2 (two) times daily for 10 days.    Lymphadenopathy  -     amoxicillin clavulanate 875-125 MG Oral Tab; Take 1 tablet by mouth 2 (two) times daily for 10 days.    Will treat for throat infection given sore throat and nodes.   If symptoms not resolved, then consider labs or US for eval.     Orders Placed This Encounter   Procedures    Rapid Strep     Order Specific Question:   Release to patient     Answer:   Immediate               Meds & Refills for this Visit:  Requested Prescriptions     Signed Prescriptions Disp Refills    amoxicillin clavulanate 875-125 MG Oral Tab 20 tablet 0     Sig: Take 1 tablet by mouth 2 (two) times daily for 10 days.             The patient indicates understanding of these issues and agrees to the plan.

## 2024-08-13 NOTE — OPERATIVE REPORT
[facility]   OPERATIVE REPORT    Yolande Burdick.  Amy Ines Patient Status:  Hospital Outpatient Surgery    1980 MRN [id number]   [facility] SURGERY Attending Breanne Zarate MD   Hosp Day # 0 PCP Stefano iSngh, DO        [address]. The external oblique fascia was closed in a running manner using 2-0 Vicryl suture. The Scarpars and Campers fascia were closed with 3-0 Vicrly. The skin was approximated with subcuticular running 4-0 Monocryl suture.   Dermobond was ultimatel Detail Level: Zone Photo Preface (Leave Blank If You Do Not Want): Photographs were obtained today

## 2024-11-21 ENCOUNTER — OFFICE VISIT (OUTPATIENT)
Dept: FAMILY MEDICINE CLINIC | Facility: CLINIC | Age: 44
End: 2024-11-21
Payer: COMMERCIAL

## 2024-11-21 VITALS
OXYGEN SATURATION: 99 % | TEMPERATURE: 97 F | RESPIRATION RATE: 18 BRPM | DIASTOLIC BLOOD PRESSURE: 70 MMHG | SYSTOLIC BLOOD PRESSURE: 118 MMHG | HEART RATE: 76 BPM | WEIGHT: 179 LBS | BODY MASS INDEX: 26 KG/M2

## 2024-11-21 DIAGNOSIS — M72.2 PLANTAR FASCIITIS: Primary | ICD-10-CM

## 2024-11-21 PROCEDURE — 3074F SYST BP LT 130 MM HG: CPT | Performed by: FAMILY MEDICINE

## 2024-11-21 PROCEDURE — 3078F DIAST BP <80 MM HG: CPT | Performed by: FAMILY MEDICINE

## 2024-11-21 PROCEDURE — 99214 OFFICE O/P EST MOD 30 MIN: CPT | Performed by: FAMILY MEDICINE

## 2024-11-21 RX ORDER — NAPROXEN 500 MG/1
500 TABLET ORAL 2 TIMES DAILY WITH MEALS
Qty: 30 TABLET | Refills: 0 | Status: SHIPPED | OUTPATIENT
Start: 2024-11-21

## 2024-11-21 NOTE — PROGRESS NOTES
Lg Elise is a 44 year old male.  Chief Complaint   Patient presents with    Foot Pain     X 1 month       HPI:   Pain in the bottoms of both feet, worse after being on feet for a long time.   Left one he notices more, but it's both feet.   Comes and goes.   By the end of the day every day the bottoms of feet hurt.   Has tried different shoes, some are better than others.   Wears shoes in the house.     ALLERGIES:  Allergies[1]      Current Outpatient Medications   Medication Sig Dispense Refill    naproxen 500 MG Oral Tab Take 1 tablet (500 mg total) by mouth 2 (two) times daily with meals. 30 tablet 0      Past Medical History:    Mass of right testis      Social History:  Social History     Socioeconomic History    Marital status:    Tobacco Use    Smoking status: Never    Smokeless tobacco: Never   Substance and Sexual Activity    Alcohol use: Yes     Comment: rarely    Drug use: Yes     Types: Cannabis     Comment: weekly        BP Readings from Last 6 Encounters:   11/21/24 118/70   05/31/24 134/84   05/11/23 130/70   04/17/23 132/80   02/21/23 110/74   12/20/21 131/83       Wt Readings from Last 6 Encounters:   11/21/24 179 lb (81.2 kg)   05/31/24 175 lb (79.4 kg)   05/11/23 163 lb 2 oz (74 kg)   04/17/23 164 lb 2 oz (74.4 kg)   02/21/23 164 lb (74.4 kg)   12/20/21 173 lb 3.2 oz (78.6 kg)       REVIEW OF SYSTEMS:   GENERAL HEALTH: feels well no complaints  SKIN: denies any unusual skin lesions or rashes  RESPIRATORY: denies shortness of breath with exertion  CARDIOVASCULAR: denies chest pain on exertion  GI: denies abdominal pain and denies heartburn  NEURO: denies headaches    EXAM:   /70   Pulse 76   Temp 97.3 °F (36.3 °C) (Temporal)   Resp 18   Wt 179 lb (81.2 kg)   SpO2 99%   BMI 26.43 kg/m²  Body mass index is 26.43 kg/m².      GENERAL: well developed, well nourished,in no apparent distress  SKIN: no rashes,no suspicious lesions  HEENT: atraumatic, normocephalic   NECK: supple,no  adenopathy   LUNGS:not distress   GI: good BS's,no masses, HSM or tenderness  EXTREMITIES: no cyanosis, clubbing or edema, no deformity of feet, no redness or tenderness in feet.     ASSESSMENT AND PLAN:     Encounter Diagnosis   Name Primary?    Plantar fasciitis Yes       Diagnoses and all orders for this visit:    Plantar fasciitis  -     naproxen 500 MG Oral Tab; Take 1 tablet (500 mg total) by mouth 2 (two) times daily with meals.    - stretch, massage, ice.   - NSAIDs as ordered.   - OTC orthotics.   - go to podiatry if not getting any better.     No orders of the defined types were placed in this encounter.              Meds & Refills for this Visit:  Requested Prescriptions     Signed Prescriptions Disp Refills    naproxen 500 MG Oral Tab 30 tablet 0     Sig: Take 1 tablet (500 mg total) by mouth 2 (two) times daily with meals.             The patient indicates understanding of these issues and agrees to the plan.               [1]   Allergies  Allergen Reactions    Vitamin D ANAPHYLAXIS    Keflex [Cephalexin] UNKNOWN

## 2025-05-27 ENCOUNTER — OFFICE VISIT (OUTPATIENT)
Dept: FAMILY MEDICINE CLINIC | Facility: CLINIC | Age: 45
End: 2025-05-27
Payer: COMMERCIAL

## 2025-05-27 VITALS
RESPIRATION RATE: 22 BRPM | OXYGEN SATURATION: 98 % | SYSTOLIC BLOOD PRESSURE: 136 MMHG | TEMPERATURE: 98 F | HEART RATE: 79 BPM | DIASTOLIC BLOOD PRESSURE: 70 MMHG | BODY MASS INDEX: 26 KG/M2 | WEIGHT: 177.19 LBS

## 2025-05-27 DIAGNOSIS — Z00.00 HEALTHY ADULT ON ROUTINE PHYSICAL EXAMINATION: Primary | ICD-10-CM

## 2025-05-27 DIAGNOSIS — R53.83 FATIGUE, UNSPECIFIED TYPE: ICD-10-CM

## 2025-05-27 DIAGNOSIS — Z12.11 SCREENING FOR COLON CANCER: ICD-10-CM

## 2025-05-27 DIAGNOSIS — Z12.5 SCREENING FOR MALIGNANT NEOPLASM OF PROSTATE: ICD-10-CM

## 2025-05-27 PROCEDURE — 99396 PREV VISIT EST AGE 40-64: CPT | Performed by: FAMILY MEDICINE

## 2025-05-27 PROCEDURE — 3075F SYST BP GE 130 - 139MM HG: CPT | Performed by: FAMILY MEDICINE

## 2025-05-27 PROCEDURE — 3078F DIAST BP <80 MM HG: CPT | Performed by: FAMILY MEDICINE

## 2025-05-27 PROCEDURE — 99213 OFFICE O/P EST LOW 20 MIN: CPT | Performed by: FAMILY MEDICINE

## 2025-05-27 NOTE — PROGRESS NOTES
Lg Elise is a 44 year old male.  Chief Complaint   Patient presents with    Stress     HPI:   Would like labs checked.  Worried about low testosterone.   Irritable, less energy. More tired half way through the day.   Less energy with working out. Gained weight, harder to get rid of.   Sleeps okay at night.   Occasional sexual dysfunction, but mostly things work fine.   Drinking more energy drinks and caffeine for energy.   Occasionally down, but not most of the time.   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   Just lost father.   He has h/o testicular cancer.     Friends are on testosterone replacement and feeling better.     ALLERGIES:  Allergies   Allergen Reactions    Vitamin D ANAPHYLAXIS    Keflex [Cephalexin] UNKNOWN         Current Outpatient Medications   Medication Sig Dispense Refill    naproxen 500 MG Oral Tab Take 1 tablet (500 mg total) by mouth 2 (two) times daily with meals. (Patient not taking: Reported on 5/27/2025) 30 tablet 0      Past Medical History:    Mass of right testis      Social History:  Social History     Socioeconomic History    Marital status:    Tobacco Use    Smoking status: Never    Smokeless tobacco: Never   Substance and Sexual Activity    Alcohol use: Yes     Comment: rarely    Drug use: Yes     Types: Cannabis     Comment: weekly     Social Drivers of Health     Food Insecurity: No Food Insecurity (5/27/2025)    NCSS - Food Insecurity     Worried About Running Out of Food in the Last Year: No     Ran Out of Food in the Last Year: No   Transportation Needs: No Transportation Needs (5/27/2025)    NCSS - Transportation     Lack of Transportation: No   Housing Stability: Not At Risk (5/27/2025)    NCSS - Housing/Utilities     Has Housing: Yes     Worried About Losing Housing: No     Unable to Get Utilities: No        BP Readings from Last 6 Encounters:   05/27/25 136/70   11/21/24 118/70   05/31/24 134/84   05/11/23 130/70   04/17/23 132/80   02/21/23 110/74        Wt Readings from Last 6 Encounters:   05/27/25 177 lb 3.2 oz (80.4 kg)   11/21/24 179 lb (81.2 kg)   05/31/24 175 lb (79.4 kg)   05/11/23 163 lb 2 oz (74 kg)   04/17/23 164 lb 2 oz (74.4 kg)   02/21/23 164 lb (74.4 kg)       REVIEW OF SYSTEMS:   GENERAL HEALTH: feels well no complaints  SKIN: denies any unusual skin lesions or rashes  RESPIRATORY: denies shortness of breath with exertion  CARDIOVASCULAR: denies chest pain on exertion  GI: denies abdominal pain and denies heartburn  NEURO: denies headaches    EXAM:   /70   Pulse 79   Temp 97.9 °F (36.6 °C) (Temporal)   Resp 22   Wt 177 lb 3.2 oz (80.4 kg)   SpO2 98%   BMI 26.17 kg/m²  Body mass index is 26.17 kg/m².      GENERAL: well developed, well nourished,in no apparent distress  SKIN: no rashes,no suspicious lesions  HEENT: atraumatic, normocephalic,ears and throat are clear  NECK: supple,no adenopathy   LUNGS: clear to auscultation  CARDIO: RRR without murmur  GI: good BS's,no masses, HSM or tenderness  EXTREMITIES: no cyanosis, clubbing or edema    ASSESSMENT AND PLAN:     Encounter Diagnoses   Name Primary?    Fatigue, unspecified type     Healthy adult on routine physical examination Yes    Screening for malignant neoplasm of prostate     Screening for colon cancer        Diagnoses and all orders for this visit:    Healthy adult on routine physical examination  -     CBC With Differential With Platelet; Future  -     Comp Metabolic Panel (14); Future  -     Lipid Panel; Future    Fatigue, unspecified type  -     TSH W Reflex To Free T4; Future  -     Vitamin D; Future  -     Ferritin; Future  -     Testosterone, Total Free, Male [E]; Future    Screening for malignant neoplasm of prostate  -     PSA Total, Screen; Future    Screening for colon cancer  -     Gastro Referral - In Network    Check labs as ordered.   Refer to urology for testosterone replacement given his history if it is low.     Orders Placed This Encounter   Procedures     CBC With Differential With Platelet     Standing Status:   Future     Expected Date:   5/27/2025     Expiration Date:   5/28/2026    Comp Metabolic Panel (14)     Standing Status:   Future     Expected Date:   5/27/2025     Expiration Date:   5/28/2026     [facility]: Has the patient fasted?:   Yes    Lipid Panel     Standing Status:   Future     Expected Date:   5/27/2025     Expiration Date:   5/27/2026     [facility]: Has the patient fasted?:   Yes    TSH W Reflex To Free T4     Standing Status:   Future     Expected Date:   5/27/2025     Expiration Date:   5/28/2026    Vitamin D     Standing Status:   Future     Expected Date:   5/27/2025     Expiration Date:   5/27/2026     Please pick the scenario that best fits the purpose for ordering this test:   General Screening/Vit D deficiency (25-Hydroxy)     Release to patient:   Immediate    Ferritin     Standing Status:   Future     Expected Date:   5/27/2025     Expiration Date:   5/28/2026    Testosterone, Total Free, Male [E]     Standing Status:   Future     Expected Date:   5/27/2025     Expiration Date:   5/27/2026     Release to patient:   Immediate    PSA Total, Screen     Standing Status:   Future     Expected Date:   5/27/2025     Expiration Date:   5/27/2026               Meds & Refills for this Visit:  Requested Prescriptions      No prescriptions requested or ordered in this encounter             The patient indicates understanding of these issues and agrees to the plan.

## 2025-05-28 ENCOUNTER — LAB ENCOUNTER (OUTPATIENT)
Dept: LAB | Age: 45
End: 2025-05-28
Attending: FAMILY MEDICINE
Payer: COMMERCIAL

## 2025-05-28 DIAGNOSIS — Z00.00 HEALTHY ADULT ON ROUTINE PHYSICAL EXAMINATION: ICD-10-CM

## 2025-05-28 DIAGNOSIS — R74.8 ELEVATED LIVER ENZYMES: ICD-10-CM

## 2025-05-28 DIAGNOSIS — R53.83 FATIGUE, UNSPECIFIED TYPE: ICD-10-CM

## 2025-05-28 DIAGNOSIS — Z12.5 SCREENING FOR MALIGNANT NEOPLASM OF PROSTATE: ICD-10-CM

## 2025-05-28 LAB
ALBUMIN SERPL-MCNC: 4.8 G/DL (ref 3.2–4.8)
ALBUMIN/GLOB SERPL: 1.8 {RATIO} (ref 1–2)
ALP LIVER SERPL-CCNC: 69 U/L (ref 45–117)
ALT SERPL-CCNC: 78 U/L (ref 10–49)
ANION GAP SERPL CALC-SCNC: 12 MMOL/L (ref 0–18)
AST SERPL-CCNC: 42 U/L (ref ?–34)
BASOPHILS # BLD AUTO: 0.04 X10(3) UL (ref 0–0.2)
BASOPHILS NFR BLD AUTO: 0.4 %
BILIRUB SERPL-MCNC: 0.5 MG/DL (ref 0.3–1.2)
BUN BLD-MCNC: 14 MG/DL (ref 9–23)
CALCIUM BLD-MCNC: 9.4 MG/DL (ref 8.7–10.6)
CHLORIDE SERPL-SCNC: 105 MMOL/L (ref 98–112)
CHOLEST SERPL-MCNC: 226 MG/DL (ref ?–200)
CO2 SERPL-SCNC: 25 MMOL/L (ref 21–32)
COMPLEXED PSA SERPL-MCNC: 1.37 NG/ML (ref ?–4)
CREAT BLD-MCNC: 1.05 MG/DL (ref 0.7–1.3)
DEPRECATED HBV CORE AB SER IA-ACNC: 127 NG/ML (ref 50–336)
EGFRCR SERPLBLD CKD-EPI 2021: 90 ML/MIN/1.73M2 (ref 60–?)
EOSINOPHIL # BLD AUTO: 0.17 X10(3) UL (ref 0–0.7)
EOSINOPHIL NFR BLD AUTO: 1.8 %
ERYTHROCYTE [DISTWIDTH] IN BLOOD BY AUTOMATED COUNT: 12.8 %
FASTING PATIENT LIPID ANSWER: YES
FASTING STATUS PATIENT QL REPORTED: YES
GLOBULIN PLAS-MCNC: 2.6 G/DL (ref 2–3.5)
GLUCOSE BLD-MCNC: 96 MG/DL (ref 70–99)
HCT VFR BLD AUTO: 42.2 % (ref 39–53)
HDLC SERPL-MCNC: 36 MG/DL (ref 40–59)
HGB BLD-MCNC: 14.6 G/DL (ref 13–17.5)
IMM GRANULOCYTES # BLD AUTO: 0.04 X10(3) UL (ref 0–1)
IMM GRANULOCYTES NFR BLD: 0.4 %
LDLC SERPL CALC-MCNC: 151 MG/DL (ref ?–100)
LYMPHOCYTES # BLD AUTO: 2.75 X10(3) UL (ref 1–4)
LYMPHOCYTES NFR BLD AUTO: 29.7 %
MCH RBC QN AUTO: 31 PG (ref 26–34)
MCHC RBC AUTO-ENTMCNC: 34.6 G/DL (ref 31–37)
MCV RBC AUTO: 89.6 FL (ref 80–100)
MONOCYTES # BLD AUTO: 0.81 X10(3) UL (ref 0.1–1)
MONOCYTES NFR BLD AUTO: 8.7 %
NEUTROPHILS # BLD AUTO: 5.46 X10 (3) UL (ref 1.5–7.7)
NEUTROPHILS # BLD AUTO: 5.46 X10(3) UL (ref 1.5–7.7)
NEUTROPHILS NFR BLD AUTO: 59 %
NONHDLC SERPL-MCNC: 190 MG/DL (ref ?–130)
OSMOLALITY SERPL CALC.SUM OF ELEC: 294 MOSM/KG (ref 275–295)
PLATELET # BLD AUTO: 298 10(3)UL (ref 150–450)
POTASSIUM SERPL-SCNC: 3.7 MMOL/L (ref 3.5–5.1)
PROT SERPL-MCNC: 7.4 G/DL (ref 5.7–8.2)
RBC # BLD AUTO: 4.71 X10(6)UL (ref 4.3–5.7)
SODIUM SERPL-SCNC: 142 MMOL/L (ref 136–145)
TRIGL SERPL-MCNC: 215 MG/DL (ref 30–149)
TSI SER-ACNC: 4.45 UIU/ML (ref 0.55–4.78)
VIT D+METAB SERPL-MCNC: 25.4 NG/ML (ref 30–100)
VLDLC SERPL CALC-MCNC: 41 MG/DL (ref 0–30)
WBC # BLD AUTO: 9.3 X10(3) UL (ref 4–11)

## 2025-05-28 PROCEDURE — 80053 COMPREHEN METABOLIC PANEL: CPT

## 2025-05-28 PROCEDURE — 84402 ASSAY OF FREE TESTOSTERONE: CPT

## 2025-05-28 PROCEDURE — 85025 COMPLETE CBC W/AUTO DIFF WBC: CPT

## 2025-05-28 PROCEDURE — 82728 ASSAY OF FERRITIN: CPT

## 2025-05-28 PROCEDURE — 36415 COLL VENOUS BLD VENIPUNCTURE: CPT

## 2025-05-28 PROCEDURE — 80074 ACUTE HEPATITIS PANEL: CPT

## 2025-05-28 PROCEDURE — 84403 ASSAY OF TOTAL TESTOSTERONE: CPT

## 2025-05-28 PROCEDURE — 82306 VITAMIN D 25 HYDROXY: CPT

## 2025-05-28 PROCEDURE — 84443 ASSAY THYROID STIM HORMONE: CPT

## 2025-05-28 PROCEDURE — 80061 LIPID PANEL: CPT

## 2025-05-29 LAB
HAV IGM SER QL: NONREACTIVE
HBV CORE IGM SER QL: NONREACTIVE
HBV SURFACE AG SERPL QL IA: NONREACTIVE
HCV AB SERPL QL IA: NONREACTIVE

## 2025-05-30 LAB
FREE TESTOST DIRECT: 13.1 PG/ML
TESTOSTERONE: 734 NG/DL

## 2025-08-28 ENCOUNTER — PATIENT MESSAGE (OUTPATIENT)
Dept: FAMILY MEDICINE CLINIC | Facility: CLINIC | Age: 45
End: 2025-08-28

## 2025-08-28 DIAGNOSIS — Z12.11 SCREENING FOR MALIGNANT NEOPLASM OF COLON: Primary | ICD-10-CM

## (undated) DEVICE — SUTURE MONOCRYL 4-0 PS-2

## (undated) DEVICE — SUTURE VICRYL 3-0 SH

## (undated) DEVICE — SPONGE: SPECIALTY PEANUT XR 100/CS: Brand: MEDICAL ACTION INDUSTRIES

## (undated) DEVICE — PREMIUM WET SKIN PREP TRAY: Brand: MEDLINE INDUSTRIES, INC.

## (undated) DEVICE — STERILE POLYISOPRENE POWDER-FREE SURGICAL GLOVES: Brand: PROTEXIS

## (undated) DEVICE — SUPER SPONGES,MEDIUM: Brand: KERLIX

## (undated) DEVICE — SUTURE VICRYL 2-0

## (undated) DEVICE — REM POLYHESIVE ADULT PATIENT RETURN ELECTRODE: Brand: VALLEYLAB

## (undated) DEVICE — SUTURE VICRYL 2-0 SH

## (undated) DEVICE — UNDYED BRAIDED (POLYGLACTIN 910), SYNTHETIC ABSORBABLE SUTURE: Brand: COATED VICRYL

## (undated) DEVICE — MINI LAP PACK-LF: Brand: MEDLINE INDUSTRIES, INC.

## (undated) DEVICE — KENDALL SCD EXPRESS SLEEVES, KNEE LENGTH, MEDIUM: Brand: KENDALL SCD

## (undated) DEVICE — SOL H2O 1000ML BTL

## (undated) DEVICE — SUTURE SILK 0

## (undated) DEVICE — DERMABOND LIQUID ADHESIVE

## (undated) DEVICE — SUTURE SILK 0 SH

## (undated) DEVICE — STRL PENROSE DRAIN 18" X 1/2": Brand: CARDINAL HEALTH

## (undated) NOTE — LETTER
01/22/18        [address] [place] [id number]      Dear Reza Mojica records indicate that you have outstanding lab work and or testing that was ordered for you and has not yet been completed:  Lab Frequency Next Occurrence   C

## (undated) NOTE — LETTER
Dorinda Medina [address]  [place], [address] [facility]    Consent for Operation  Date: __________________                                Time: _______________    1. I authorize the performance upon Yolande Burdick.  Amy Chacon the following operation:  Procedur procedure has been videotaped, the surgeon will obtain the original videotape. The hospital will not be responsible for storage or maintenance of this tape.   7. For the purpose of advancing medical education, I consent to the admittance of observers to the STATEMENTS REQUIRING INSERTION OR COMPLETION WERE FILLED IN.     Signature of Patient:   ___________________________    When the patient is a minor or mentally incompetent to give consent:  Signature of person authorized to consent for patient: ____________ supplements, and pills I can buy without a prescription (including street drugs/illegal medications). Failure to inform my anesthesiologist about these medicines may increase my risk of anesthetic complications. iv.  If I am allergic to anything or have ha Anesthesiologist Signature     Date   Time  I have discussed the procedure and information above with the patient (or patient’s representative) and answered their questions. The patient or their representative has agreed to have anesthesia services.     ___